# Patient Record
Sex: FEMALE | Race: BLACK OR AFRICAN AMERICAN | NOT HISPANIC OR LATINO | URBAN - NONMETROPOLITAN AREA
[De-identification: names, ages, dates, MRNs, and addresses within clinical notes are randomized per-mention and may not be internally consistent; named-entity substitution may affect disease eponyms.]

---

## 2018-06-25 PROBLEM — H18.832: Noted: 2018-06-25

## 2020-06-11 ENCOUNTER — IMPORTED ENCOUNTER (OUTPATIENT)
Dept: URBAN - NONMETROPOLITAN AREA CLINIC 1 | Facility: CLINIC | Age: 35
End: 2020-06-11

## 2020-06-11 PROCEDURE — 92071 CONTACT LENS FITTING FOR TX: CPT

## 2020-06-11 PROCEDURE — 92012 INTRM OPH EXAM EST PATIENT: CPT

## 2020-06-11 NOTE — PATIENT DISCUSSION
Recurrent Erosion OS -STAINING TODAY-INSERT BCL-START BESI QD OS-PT TO REMOVE BCL ON OWN AFTER 4 DAYS THEN STOP

## 2022-04-09 ASSESSMENT — VISUAL ACUITY
OS_SC: 20/25
OD_SC: 20/20